# Patient Record
Sex: MALE | Race: WHITE | Employment: OTHER | URBAN - METROPOLITAN AREA
[De-identification: names, ages, dates, MRNs, and addresses within clinical notes are randomized per-mention and may not be internally consistent; named-entity substitution may affect disease eponyms.]

---

## 2018-02-20 ENCOUNTER — ANESTHESIA (OUTPATIENT)
Dept: ENDOSCOPY | Age: 68
End: 2018-02-20
Payer: COMMERCIAL

## 2018-02-20 ENCOUNTER — HOSPITAL ENCOUNTER (OUTPATIENT)
Age: 68
Setting detail: OUTPATIENT SURGERY
Discharge: HOME | End: 2018-02-20
Attending: INTERNAL MEDICINE | Admitting: INTERNAL MEDICINE
Payer: COMMERCIAL

## 2018-02-20 ENCOUNTER — ANESTHESIA EVENT (OUTPATIENT)
Dept: ENDOSCOPY | Age: 68
End: 2018-02-20
Payer: COMMERCIAL

## 2018-02-20 VITALS
TEMPERATURE: 98.1 F | OXYGEN SATURATION: 92 % | HEART RATE: 55 BPM | SYSTOLIC BLOOD PRESSURE: 113 MMHG | HEIGHT: 72 IN | WEIGHT: 253 LBS | RESPIRATION RATE: 18 BRPM | DIASTOLIC BLOOD PRESSURE: 57 MMHG | BODY MASS INDEX: 34.27 KG/M2

## 2018-02-20 LAB — GLUCOSE BLD STRIP.AUTO-MCNC: 157 MG/DL (ref 65–100)

## 2018-02-20 PROCEDURE — 74011250636 HC RX REV CODE- 250/636: Performed by: INTERNAL MEDICINE

## 2018-02-20 PROCEDURE — 88305 TISSUE EXAM BY PATHOLOGIST: CPT | Performed by: INTERNAL MEDICINE

## 2018-02-20 PROCEDURE — 74011250636 HC RX REV CODE- 250/636

## 2018-02-20 PROCEDURE — 76040000026: Performed by: INTERNAL MEDICINE

## 2018-02-20 PROCEDURE — 77030011640 HC PAD GRND REM COVD -A: Performed by: INTERNAL MEDICINE

## 2018-02-20 PROCEDURE — 82962 GLUCOSE BLOOD TEST: CPT

## 2018-02-20 PROCEDURE — 77030013991 HC SNR POLYP ENDOSC BSC -A: Performed by: INTERNAL MEDICINE

## 2018-02-20 PROCEDURE — 74011250636 HC RX REV CODE- 250/636: Performed by: ANESTHESIOLOGY

## 2018-02-20 PROCEDURE — 77030029929: Performed by: INTERNAL MEDICINE

## 2018-02-20 PROCEDURE — 76060000032 HC ANESTHESIA 0.5 TO 1 HR: Performed by: INTERNAL MEDICINE

## 2018-02-20 RX ORDER — SODIUM CHLORIDE, SODIUM LACTATE, POTASSIUM CHLORIDE, CALCIUM CHLORIDE 600; 310; 30; 20 MG/100ML; MG/100ML; MG/100ML; MG/100ML
100 INJECTION, SOLUTION INTRAVENOUS CONTINUOUS
Status: DISCONTINUED | OUTPATIENT
Start: 2018-02-20 | End: 2018-02-20 | Stop reason: HOSPADM

## 2018-02-20 RX ORDER — PROPOFOL 10 MG/ML
INJECTION, EMULSION INTRAVENOUS AS NEEDED
Status: DISCONTINUED | OUTPATIENT
Start: 2018-02-20 | End: 2018-02-20 | Stop reason: HOSPADM

## 2018-02-20 RX ORDER — PROPOFOL 10 MG/ML
INJECTION, EMULSION INTRAVENOUS
Status: DISCONTINUED | OUTPATIENT
Start: 2018-02-20 | End: 2018-02-20 | Stop reason: HOSPADM

## 2018-02-20 RX ORDER — SODIUM CHLORIDE 0.9 % (FLUSH) 0.9 %
5-10 SYRINGE (ML) INJECTION AS NEEDED
Status: CANCELLED | OUTPATIENT
Start: 2018-02-20

## 2018-02-20 RX ORDER — ONDANSETRON 2 MG/ML
INJECTION INTRAMUSCULAR; INTRAVENOUS AS NEEDED
Status: DISCONTINUED | OUTPATIENT
Start: 2018-02-20 | End: 2018-02-20 | Stop reason: HOSPADM

## 2018-02-20 RX ORDER — SODIUM CHLORIDE, SODIUM LACTATE, POTASSIUM CHLORIDE, CALCIUM CHLORIDE 600; 310; 30; 20 MG/100ML; MG/100ML; MG/100ML; MG/100ML
100 INJECTION, SOLUTION INTRAVENOUS CONTINUOUS
Status: CANCELLED | OUTPATIENT
Start: 2018-02-20

## 2018-02-20 RX ADMIN — PROPOFOL 250 MCG/KG/MIN: 10 INJECTION, EMULSION INTRAVENOUS at 12:13

## 2018-02-20 RX ADMIN — PROPOFOL 20 MG: 10 INJECTION, EMULSION INTRAVENOUS at 12:12

## 2018-02-20 RX ADMIN — ONDANSETRON 4 MG: 2 INJECTION INTRAMUSCULAR; INTRAVENOUS at 12:30

## 2018-02-20 RX ADMIN — ONABOTULINUMTOXINA 100 UNITS: 100 INJECTION, POWDER, LYOPHILIZED, FOR SOLUTION INTRADERMAL; INTRAMUSCULAR at 12:22

## 2018-02-20 RX ADMIN — SODIUM CHLORIDE, SODIUM LACTATE, POTASSIUM CHLORIDE, AND CALCIUM CHLORIDE 100 ML/HR: 600; 310; 30; 20 INJECTION, SOLUTION INTRAVENOUS at 12:04

## 2018-02-20 NOTE — ROUTINE PROCESS
VSS. Discharge instructions reviewed with patient and wife and copy of instructions sent home with patient. Dr. Aracely Copeland spoke with patient prior to discharge. Questions answered. Discharged via car, wheeled out by [employer]. IV discontinued prior to discharge.      Personal items with patient at discharge: clothing

## 2018-02-20 NOTE — PROCEDURES
COLONOSCOPY    DATE of PROCEDURE: 2/20/2018    MEDICATION:  MAC      INSTRUMENT: PCF    PROCEDURE: After obtaining informed consent, the patient was placed in the left lateral position and sedated. The endoscope was advanced to the cecum where the appendiceal orifice and ileocecal valve were identified. On withdrawal, the colon was carefully visualized in a 360 degree fashion. Retroflexion was performed in the rectum. The patient was taken to the recovery area in stable condition. FINDINGS:  Anus: external hemorrhoids  Rectum: internal hemorrhoids  Sigmoid: diverticulosis present  Descending Colon: diverticulosis present  Transverse Colon: diverticulosis present  Ascending Colon: 8 mm polyp removed by snare cautery. Diverticulosis present. Retroflexion performed. Cecum: normal  Terminal ileum: normal    Estimated blood loss: 0-minimal     ASSESSMENT/PLAN:  1.  Repeat colonoscopy 5 years      Dar Sung MD  [facility], [place]

## 2018-02-20 NOTE — H&P
[facility] H&P (Admission)        Date:  2/20/2018    Chief Complaint:  Dysphagia-achalasia, hx polyps    Subjective:     History of Present Illness:  Patient is a 75 y.o. being admitted for EGD/colon. PMH:  Past Medical History:   Diagnosis Date    Acute kidney failure, unspecified 3/27/2013    Adverse effect of anesthesia     AMI (acute myocardial infarction) 2001    CAD (coronary artery disease)     cardiac cath 12 years ago, no stents    Cardiorespiratory arrest -- likely from respiratory etiology. 12/16/2013    Chronic pain     lower back pain    COPD exacerbation ([facility] [address] 3/26/2013    COPD with acute exacerbation ([facility] [address] 2012    hospitalized at [facility]    Diabetes ([facility] [address]     type 2, adverage -155, none symptomatic    Dysphagia     GERD (gastroesophageal reflux disease)     hx of- no meds at this time    Hypertension     Insulin dependent diabetes mellitus ([facility] [address] 03/26/2013    avg- 160- hypo in 46s    Pancreatitis     2-2018    Sleep apnea     Unspecified adverse effect of anesthesia 12/16/13    \"stopped\" breathing w moderate sedation       PSH:  Past Surgical History:   Procedure Laterality Date    CARDIAC SURG PROCEDURE UNLIST      cardiac cath no stents    HX GI      EGD times 3    HX OTHER SURGICAL      skin tag removal from leg       Allergies:  No Known Allergies    Home Medications:  Prior to Admission medications    Medication Sig Start Date End Date Taking? Authorizing Provider   budesonide-formoterol (SYMBICORT) 160-4.5 mcg/actuation HFAA 2 puffs bid, rinse mouth after use. 2/19/18  Yes Hazel Nix NP   rosuvastatin (CRESTOR) 40 mg tablet Take 40 mg by mouth daily. Yes Historical Provider   insulin NPH/insulin regular (NOVOLIN 70/30) 100 unit/mL (70-30) injection by SubCUTAneous route. 70 units in the morning and 70 in evening   Yes Historical Provider   lisinopril (PRINIVIL, ZESTRIL) 10 mg tablet Take  by mouth nightly.    Yes Historical Provider   OXYGEN-AIR DELIVERY SYSTEMS 3 L by Nasal route nightly. Yes Historical Provider   cpap machine kit by Does Not Apply route. Yes Historical Provider   aspirin 81 mg tablet Take 81 mg by mouth daily. Take DOS per Dr Nick Greenwood   Yes Historical Provider   magnesium oxide (MAG-OX) 400 mg tablet Take 400 mg by mouth daily. Uses for chronic low Mag. Yes Historical Provider   metformin (GLUCOPHAGE) 1,000 mg tablet Take 1,000 mg by mouth two (2) times daily (with meals). Yes Naman Velazquez, MD   metoprolol (LOPRESSOR) 50 mg tablet Take 50 mg by mouth two (2) times a day. Take day of surgery per anesthesia protocol. Indications: HYPERTENSION   Yes Naman Velazquez MD   fenofibrate nanocrystallized (TRICOR) 145 mg tablet Take 145 mg by mouth nightly. Take day of surgery per anesthesia protocol. Indications: hypercholesterolemia, hyperlipidemia   Yes Naman Velazquez MD   sitagliptin (JANUVIA) 100 mg tablet Take 100 mg by mouth daily. Indications: TYPE 2 DIABETES MELLITUS   Yes Naman Velazquez MD   albuterol (PROVENTIL HFA, VENTOLIN HFA) 90 mcg/actuation inhaler Take 2 Puffs by inhalation every four (4) hours as needed for Wheezing. 3/29/13   Elian Diamond NP       Hospital Medications:  No current facility-administered medications for this encounter. Social History:  Social History   Substance Use Topics    Smoking status: Former Smoker     Packs/day: 1.00     Years: 30.00     Types: Cigarettes     Quit date: 4/30/2000    Smokeless tobacco: Never Used      Comment: quit 12 years ago    Alcohol use 0.0 oz/week     0 Standard drinks or equivalent per week      Comment: RARE         Family History:  Family History   Problem Relation Age of Onset    Diabetes Maternal Grandmother        Review of Systems:  A detailed 10 system ROS is obtained, with pertinent positives as listed above. All others are negative.     Objective:     Physical Exam:  Vitals:  Visit Vitals    /63    Pulse 62    Temp 98.1 °F (36.7 °C)    Resp 16    Ht 6' (1.829 m)    Wt 114.8 kg (253 lb)    SpO2 93%    BMI 34.31 kg/m2     Gen:  Pt is alert, cooperative, no acute distress  Skin: no large lesions  HEENT: MMM  Cardiovascular: Regular rate and rhythm. No murmurs, gallops, or rubs. Respiratory:  Comfortable breathing with no accessory muscle use. Clear breath sounds with no wheezes, rales, or rhonchi. GI:  Abdomen nondistended, soft, and nontender. Normal active bowel sounds. Neurological:  Gross memory appears intact. Patient is alert and oriented. Psychiatric:  Mood appears appropriate with judgement intact. Laboratory:    No results for input(s): WBC, HGB, HCT, PLT, MCV, NA, K, CL, CO2, BUN, CREA, CA, MG, GLU, AP, SGOT, GPT, TBIL, CBIL, ALB, TP, AML, LPSE, PTP, INR, APTT, HGBEXT, HCTEXT, PLTEXT in the last 72 hours. No lab exists for component: DBIL, INREXT    Assessment:       Active Problems:    * No active hospital problems. *      Plan:     Endoscopy and risks explained to the patient. Risks including reaction to sedation, cardiopulmonary events, infection, bleeding, perforation, requirement for surgery if complications should occur, death were explained to patient who expressed understanding and agreed to proceed with endoscopy.       Aicha Sanchez MD  [facility], [place]

## 2018-02-20 NOTE — DISCHARGE INSTRUCTIONS
Gastrointestinal Esophagogastroduodenoscopy (EGD)/ Endoscopic Ultrasound(EUS)- Upper Exam Discharge Instructions    1. Call Dr. Colleen Davis  for any problems or questions. 2. Contact the doctor's office for follow up appointment as directed. 3. Medication may cause drowsiness for several hours, therefore, do not drive or operate machinery for remainder of the day. 4. No alcohol today. 5. Ordinarily, you may resume regular diet and activity after exam unless otherwise specified by your physician. 6. For mild soreness in your throat you may use Cepacol throat lozenges or warm  salt-water gargles as needed. Any additional instructions:     1. Follow up in the office. Instructions given to Angella Braxton and other family members. Gastrointestinal Colonoscopy/Flexible Sigmoidoscopy - Lower Exam Discharge Instructions  1. Call Dr. Colleen Davis for any problems or questions. 2. Contact the doctors office for follow up appointment as directed  3. Medication may cause drowsiness for several hours, therefore, do not drive or operate machinery for remainder of the day. 4. No alcohol today. 5. Ordinarily, you may resume regular diet and activity after exam unless otherwise specified by your physician. 6. Because of air put into your colon during exam, you may experience some abdominal distension, relieved by the passage of gas, for several hours. 7. Contact your physician if you have any of the following:  a. Excessive amount of bleeding - large amount when having a bowel movement. Occasional specks of blood with bowel movement would not be unusual.  b. Severe abdominal pain  c. Fever or Chills  8. Polyp Removal - follow these additional instructions  a. Take Metamucil - 1 tablespoon in juice every morning for 3 days, if needed. b. No Aspirin, Advil, Aleve, Nuprin, Ibuprofen, or medications that contain these drugs for 2 weeks. Any additional instructions:  1. Repeat colonoscopy 5 years.       Instructions given to Mable Mac and other family members.

## 2018-02-20 NOTE — PROCEDURES
Esophagogastroduodenoscopy    DATE of PROCEDURE: 2/20/2018    MEDICATIONS ADMINISTERED: MAC    INSTRUMENT: GIF    PROCEDURE:  After obtaining informed consent, the patient was placed in the left lateral position and sedated. The endoscope was advanced under direct vision without difficulty. The esophagus, stomach (including retroflexed views) and duodenum were evaluated. The patient was taken to the recovery area in stable condition. FINDINGS:  ESOPHAGUS: normal. 1 cm above the dentate line, 100 units Botox were injected, 25 units each in 4 quadrants. STOMACH: normal  DUODENUM: normal with normal transverse views of the papilla    Estimated blood loss: 0-minimal     PLAN:  1.  Follow up in the office    Chanell Torres MD  [facility], [address]

## 2018-02-20 NOTE — ANESTHESIA PREPROCEDURE EVALUATION
Anesthetic History          Comments: Brief cardiac arrest during endoscopy procedure 5 yrs ago. Quickly resuscitated. Subsequently tolerated GETA well.      Review of Systems / Medical History  Patient summary reviewed, nursing notes reviewed and pertinent labs reviewed    Pulmonary    COPD: moderate    Sleep apnea           Neuro/Psych              Cardiovascular    Hypertension          CAD (17 yrs s/p MI)    Exercise tolerance: >4 METS     GI/Hepatic/Renal     GERD (Quiescent)           Endo/Other    Diabetes: well controlled, type 2, using insulin    Obesity     Other Findings              Physical Exam    Airway  Mallampati: II  TM Distance: > 6 cm  Neck ROM: decreased range of motion   Mouth opening: Normal     Cardiovascular  Regular rate and rhythm,  S1 and S2 normal,  no murmur, click, rub, or gallop             Dental      Comments: Capped upper incisors   Pulmonary  Breath sounds clear to auscultation               Abdominal  GI exam deferred       Other Findings            Anesthetic Plan    ASA: 3  Anesthesia type: total IV anesthesia            Anesthetic plan and risks discussed with: Patient

## 2018-02-20 NOTE — IP AVS SNAPSHOT
[address] [facility] 
[phone] Patient: Tara De Paz MRN: [id number] CMM:[birth year] About your hospitalization You were admitted on:  February 20, 2018 You last received care in the:  D ENDOSCOPY You were discharged on:  February 20, 2018 Why you were hospitalized Your primary diagnosis was:  Not on File Follow-up Information None Discharge Orders None A check carlos indicates which time of day the medication should be taken. My Medications ASK your doctor about these medications Instructions Each Dose to Equal  
 Morning Noon Evening Bedtime  
 albuterol 90 mcg/actuation inhaler Commonly known as:  PROVENTIL HFA, VENTOLIN HFA, PROAIR HFA Your last dose was: Your next dose is: Take 2 Puffs by inhalation every four (4) hours as needed for Wheezing. 2 Puff  
    
   
   
   
  
 aspirin 81 mg tablet Your last dose was: Your next dose is: Take 81 mg by mouth daily. Take DOS per Dr Cory Hartley 81 mg  
    
   
   
   
  
 budesonide-formoterol 160-4.5 mcg/actuation Hfaa Commonly known as:  SYMBICORT Your last dose was: Your next dose is:    
   
   
 2 puffs bid, rinse mouth after use. cpap machine kit Your last dose was: Your next dose is:    
   
   
 by Does Not Apply route. CRESTOR 40 mg tablet Generic drug:  rosuvastatin Your last dose was: Your next dose is: Take 40 mg by mouth daily. 40 mg  
    
   
   
   
  
 JANUVIA 100 mg tablet Generic drug:  SITagliptin Your last dose was: Your next dose is: Take 100 mg by mouth daily. Indications: TYPE 2 DIABETES MELLITUS  
 100 mg  
    
   
   
   
  
 lisinopril 10 mg tablet Commonly known as:  Melba Fernandez  
   
 Your last dose was: Your next dose is: Take  by mouth nightly.  
     
   
   
   
  
 magnesium oxide 400 mg tablet Commonly known as:  MAG-OX Your last dose was: Your next dose is: Take 400 mg by mouth daily. Uses for chronic low Mag.  
 400 mg  
    
   
   
   
  
 metFORMIN 1,000 mg tablet Commonly known as:  GLUCOPHAGE Your last dose was: Your next dose is: Take 1,000 mg by mouth two (2) times daily (with meals). 1000 mg  
    
   
   
   
  
 metoprolol tartrate 50 mg tablet Commonly known as:  LOPRESSOR Your last dose was: Your next dose is: Take 50 mg by mouth two (2) times a day. Take day of surgery per anesthesia protocol. Indications: HYPERTENSION 50 mg NovoLIN 70/30 U-100 Insulin 100 unit/mL (70-30) injection Generic drug:  insulin NPH/insulin regular Your last dose was: Your next dose is:    
   
   
 by SubCUTAneous route. 70 units in the morning and 70 in evening OXYGEN-AIR DELIVERY SYSTEMS Your last dose was: Your next dose is:    
   
   
 3 L by Nasal route nightly. 3 L  
    
   
   
   
  
 TRICOR 145 mg tablet Generic drug:  fenofibrate nanocrystallized Your last dose was: Your next dose is: Take 145 mg by mouth nightly. Take day of surgery per anesthesia protocol. Indications: hypercholesterolemia, hyperlipidemia 145 mg Discharge Instructions Gastrointestinal Esophagogastroduodenoscopy (EGD)/ Endoscopic Ultrasound(EUS)- Upper Exam Discharge Instructions 1. Call Dr. Dar Leslie  for any problems or questions. 2. Contact the doctor's office for follow up appointment as directed. 3. Medication may cause drowsiness for several hours, therefore, do not drive or operate machinery for remainder of the day. 4. No alcohol today. 5. Ordinarily, you may resume regular diet and activity after exam unless otherwise specified by your physician. 6. For mild soreness in your throat you may use Cepacol throat lozenges or warm  salt-water gargles as needed. Any additional instructions:  
 
1. Follow up in the office. Instructions given to Bindu Stoll and other family members. Gastrointestinal Colonoscopy/Flexible Sigmoidoscopy - Lower Exam Discharge Instructions 1. Call Dr. Marcelo Mead for any problems or questions. 2. Contact the doctors office for follow up appointment as directed 3. Medication may cause drowsiness for several hours, therefore, do not drive or operate machinery for remainder of the day. 4. No alcohol today. 5. Ordinarily, you may resume regular diet and activity after exam unless otherwise specified by your physician. 6. Because of air put into your colon during exam, you may experience some abdominal distension, relieved by the passage of gas, for several hours. 7. Contact your physician if you have any of the following: 
a. Excessive amount of bleeding  large amount when having a bowel movement. Occasional specks of blood with bowel movement would not be unusual. 
b. Severe abdominal pain 
c. Fever or Chills 8. Polyp Removal  follow these additional instructions 
a. Take Metamucil  1 tablespoon in juice every morning for 3 days, if needed. b. No Aspirin, Advil, Aleve, Nuprin, Ibuprofen, or medications that contain these drugs for 2 weeks. Any additional instructions: 1. Repeat colonoscopy 5 years. Instructions given to Bindu Stoll and other family members. Introducing [facility]! Dear Katlyn Dewey: Thank you for requesting a [employer]ix account. Our records indicate that you already have an active hipix account. You can access your account anytime at [email or web address] Did you know that you can access your hospital and ER discharge instructions at any time in [employer]? You can also review all of your test results from your hospital stay or ER visit. Additional Information If you have questions, please visit the Frequently Asked Questions section of the [employer] website at [email or web address] Remember, [employer] is NOT to be used for urgent needs. For medical emergencies, dial 911. Now available from your iPhone and Android! Providers Seen During Your Hospitalization Provider Specialty Primary office phone Jamie Austin MD Gastroenterology [phone] Your Primary Care Physician (PCP) Primary Care Physician Office Phone Office Fax Cristiana Fuentes [phone] You are allergic to the following No active allergies Recent Documentation Height Weight BMI Smoking Status 1.829 m 114.8 kg 34.31 kg/m2 Former Smoker Emergency Contacts Name Discharge Info Relation Home Work Mobile Manuel Petit  Spouse [3] [phone] Patient Belongings The following personal items are in your possession at time of discharge: 
  Dental Appliances: None  Visual Aid: Magnifying glass Please provide this summary of care documentation to your next provider. Signatures-by signing, you are acknowledging that this After Visit Summary has been reviewed with you and you have received a copy. Patient Signature:  ____________________________________________________________ Date:  ____________________________________________________________  
  
Cherymaria esther Mehta Provider Signature:  ____________________________________________________________ Date:  ____________________________________________________________

## 2018-02-20 NOTE — ANESTHESIA POSTPROCEDURE EVALUATION
Post-Anesthesia Evaluation and Assessment    Patient: Nisa Sosa MRN: [id number]  SSN: [id number]    YOB: 1950  Age: 75 y.o. Sex: male       Cardiovascular Function/Vital Signs  Visit Vitals    /55    Pulse 66    Temp 36.7 °C (98.1 °F)    Resp 20    Ht 6' (1.829 m)    Wt 114.8 kg (253 lb)    SpO2 92%    BMI 34.31 kg/m2       Patient is status post total IV anesthesia anesthesia for Procedure(s):  ESOPHAGOGASTRODUODENOSCOPY (EGD)  COLONOSCOPY/ BMI=35  BOTOX INJECTION  ENDOSCOPIC POLYPECTOMY. Nausea/Vomiting: None    Postoperative hydration reviewed and adequate. Pain:  Pain Scale 1: Numeric (0 - 10) (02/20/18 1316)  Pain Intensity 1: 0 (02/20/18 1316)   Managed    Neurological Status: At baseline    Mental Status and Level of Consciousness: Arousable    Pulmonary Status:   O2 Device: Room air (02/20/18 1316)   Adequate oxygenation and airway patent    Complications related to anesthesia: None    Post-anesthesia assessment completed.  No concerns    Signed By: Basil Vu MD     February 20, 2018

## 2019-01-16 PROBLEM — E66.01 SEVERE OBESITY (HCC): Status: ACTIVE | Noted: 2019-01-16

## 2022-03-19 PROBLEM — E66.01 SEVERE OBESITY (HCC): Status: ACTIVE | Noted: 2019-01-16

## 2022-10-04 ENCOUNTER — OFFICE VISIT (OUTPATIENT)
Dept: PULMONOLOGY | Age: 72
End: 2022-10-04
Payer: COMMERCIAL

## 2022-10-04 VITALS
WEIGHT: 263 LBS | SYSTOLIC BLOOD PRESSURE: 132 MMHG | DIASTOLIC BLOOD PRESSURE: 68 MMHG | TEMPERATURE: 97.1 F | HEIGHT: 72 IN | OXYGEN SATURATION: 93 % | HEART RATE: 70 BPM | BODY MASS INDEX: 35.62 KG/M2

## 2022-10-04 DIAGNOSIS — R09.02 HYPOXEMIA: ICD-10-CM

## 2022-10-04 DIAGNOSIS — J44.9 COPD, SEVERE (HCC): Primary | Chronic | ICD-10-CM

## 2022-10-04 DIAGNOSIS — G47.33 OBSTRUCTIVE SLEEP APNEA (ADULT) (PEDIATRIC): ICD-10-CM

## 2022-10-04 PROCEDURE — 99213 OFFICE O/P EST LOW 20 MIN: CPT | Performed by: NURSE PRACTITIONER

## 2022-10-04 PROCEDURE — 1123F ACP DISCUSS/DSCN MKR DOCD: CPT | Performed by: NURSE PRACTITIONER

## 2022-10-04 RX ORDER — ALBUTEROL SULFATE 2.5 MG/3ML
2.5 SOLUTION RESPIRATORY (INHALATION) 4 TIMES DAILY PRN
Qty: 360 ML | Refills: 11 | Status: SHIPPED | OUTPATIENT
Start: 2022-10-04

## 2022-10-04 RX ORDER — VITAMIN B COMPLEX
TABLET ORAL
COMMUNITY

## 2022-10-04 RX ORDER — ICOSAPENT ETHYL 1000 MG/1
CAPSULE ORAL
COMMUNITY
Start: 2019-09-10

## 2022-10-04 RX ORDER — ASPIRIN 81 MG/1
81 TABLET ORAL DAILY
COMMUNITY

## 2022-10-04 ASSESSMENT — ENCOUNTER SYMPTOMS
WHEEZING: 0
SHORTNESS OF BREATH: 1
CHEST TIGHTNESS: 0
COUGH: 1

## 2022-10-04 NOTE — PROGRESS NOTES
He is a 76-year-old male seen today for follow-up of COPD, sleep apnea, nocturnal hypoxia. I last saw him 2/2018. Since that time, he has been followed by Dr. Bessie Jimenez, as recently as 3/2022. Records are reviewed. DIAGNOSTICS:     CXR 3/13, CT of chest 3/2013. Spirometry 6/2013 - combined severe obstructive restrictive defect. Lung volumes were increased consistent with significant obstructive airways disease. The diffusion capacity corrected for alveolar volume was normal suggesting no loss of alveolo-capillary units. This is study is consistent with adiagnosis of asthma given the prominent bronchodilator response. Spirometry 10/14/13 - severe obstructive defect, no significant interval change. CXR 12/16/13 - clear lungs. Spirometry 4/16/14- severe obstructive defect, no significant interval change. spirometry 7/2015. Spirometry 2/7/2017 - moderately severe obstructive and restrictive defects, no significant change. Spirometry 1/2020-severe obstructive defect. No significant change. Spirometry 9/2021-severe obstructive defect with decreased FVC. No significant change from 2020.

## 2022-10-04 NOTE — PROGRESS NOTES
[address] Srinivasa Murphy. [address], [address] [facility]  [phone]    Patient Name:  Phuong Young II    YOB: 1950    Office Visit 10/4/2022      CHIEF COMPLAINT:      Chief Complaint   Patient presents with    COPD    Follow-up         ASSESSMENT:   (Medical Decision Making)                                                                                                                                          Encounter Diagnoses   Name Primary? COPD, severe ([address] Yes    Obstructive sleep apnea (adult) (pediatric)     Hypoxemia      He is stable symptomatically and compliant with Trelegy inhaler. Has used albuterol intermittently with good response. States that albuterol for nebulizer has  and he request refill. Reports compliance, good response and tolerance to CPAP. Compliant with oxygen bled into CPAP. Has discussed having oxygen concentrator serviced with DME company. PLAN:     Continue trelegy 100, 1 inhalation every morning, rinse mouth after use. Albuterol inhaler or nebulizer 4 times daily as needed. Continue CPAP and oxygen with sleep as prescribed. Newest COVID booster approximately 90 days from COVID infection. Flu vaccine in the next week or 2 assuming he continues to improve. He is up-to-date on pneumonia vaccines. Orders Placed This Encounter   Medications    albuterol (PROVENTIL) (2.5 MG/3ML) 0.083% nebulizer solution     Sig: Take 3 mLs by nebulization 4 times daily as needed for Wheezing or Shortness of Breath     Dispense:  360 mL     Refill:  11          Follow-up and Dispositions    Return in about 6 months (around 2023) for Genaro han MD (needs new provider appt w/ Dr Sj Montalvo or Dr Aris Huntley), COPD, YUNIOR,  , PFT's. Migue Staton, APRN - CNP    Total  time spent with patient -  32 min.      Collaborating MD: Dr. Celeste Cardenas:    He is a 59-year-old male seen today for follow-up of COPD, sleep apnea, nocturnal hypoxia. I last saw him 2018. Since that time, he has been followed by Dr. Radha Amin, as recently as 3/2022. Records are reviewed. He reports that he had COVID in September, following a cruise in August.  He reports having worsening shortness of breath at that time but has now improved. Has minimal, nonproductive cough. Minimal intermittent wheezing. He is compliant with Trelegy inhaler. Has used albuterol inhaler on average of 1-2 times daily with good response. States that he needs refill for albuterol via nebulizer to have on hand as his is . He has not required it recently. Reports nightly compliance with CPAP/oxygen, continues to note good response. DIAGNOSTICS:     CXR 3/13, CT of chest 3/2013. Spirometry 2013 - combined severe obstructive restrictive defect. Lung volumes were increased consistent with significant obstructive airways disease. The diffusion capacity corrected for alveolar volume was normal suggesting no loss of alveolo-capillary units. This is study is consistent with adiagnosis of asthma given the prominent bronchodilator response. Spirometry 10/14/13 - severe obstructive defect, no significant interval change. CXR 13 - clear lungs. Spirometry 14- severe obstructive defect, no significant interval change. spirometry 2015. Spirometry 2017 - moderately severe obstructive and restrictive defects, no significant change. Spirometry 2020-severe obstructive defect. No significant change. Spirometry 2021-severe obstructive defect with decreased FVC. No significant change from .    _____________________________________________________________      REVIEW OF SYSTEMS:    Review of Systems   Constitutional:  Negative for chills, fatigue, fever and unexpected weight change. Respiratory:  Positive for cough and shortness of breath. Negative for chest tightness and wheezing. Cardiovascular:  Negative for chest pain. PHYSICAL EXAM:    Vitals:    10/04/22 1515   BP: 132/68   Pulse: 70   Temp: 97.1 °F (36.2 °C)   TempSrc: Skin   SpO2: 93%   Weight: 263 lb (119.3 kg)   Height: 6' (1.829 m)        Body mass index is 35.67 kg/m². GENERAL APPEARANCE:  The patient is obese and in no respiratory distress. HEENT:  PERRL. Conjunctivae unremarkable. NECK/LYMPHATIC:   Symmetrical with no elevation of jugular venous pulsation. Trachea midline. LUNGS:   Normal respiratory effort with symmetrical lung expansion. Breath sounds-decreased but clear. Beatriz Bhavesh HEART:   There is a normal rate and regular rhythm. No murmur, rub, or gallop. There is no edema in the lower extremities. NEURO:  The patient is alert and oriented to person, place, and time. Memory appears intact and mood is normal.  No gross sensorimotor deficits are present. DIAGNOSTIC TESTS: Studies were personally reviewed by me and discussed with the patient.     Past Medical History:   Diagnosis Date    Acute kidney failure, unspecified ([address] 3/27/2013    Adverse effect of anesthesia     AMI (acute myocardial infarction) ([address] 2001    CAD (coronary artery disease)     cardiac cath 12 years ago, no stents    Cardiorespiratory arrest ([address] 12/16/2013    Chronic pain     lower back pain    COPD exacerbation ([address] 3/26/2013    COPD with acute exacerbation ([address] 2012    hospitalized at [facility]    Diabetes ([facility]     type 2, adverage -155, none symptomatic    Dysphagia     GERD (gastroesophageal reflux disease)     hx of- no meds at this time    Hypertension     Insulin dependent diabetes mellitus 03/26/2013    avg- 160- hypo in 50s    Pancreatitis     2-2018    Sleep apnea     Unspecified adverse effect of anesthesia 12/16/13    \"stopped\" breathing w moderate sedation       Patient Active Problem List   Diagnosis    CAD (coronary artery disease)    Hypoxemia    Obstructive sleep apnea (adult) (pediatric)    Obesity DM (diabetes mellitus) ([facility]    Hyperlipidemia    COPD, severe (HCC)    Severe obesity (HCC)    HTN (hypertension)    Hx of tobacco use, presenting hazards to health       Past Surgical History:   Procedure Laterality Date    COLONOSCOPY N/A 2018    COLONOSCOPY/ BMI=35 performed by Medardo Menon MD at [facility] ENDOSCOPY    GI      EGD times 3    OTHER SURGICAL HISTORY      skin tag removal from leg    [place] CARDIAC SURG PROCEDURE UNLIST      cardiac cath no stents         Social History     Socioeconomic History    Marital status:      Spouse name: None    Number of children: None    Years of education: None    Highest education level: None   Tobacco Use    Smoking status: Former     Packs/day: 1.00     Years: 30.00     Pack years: 30.00     Types: Cigarettes     Quit date: 2000     Years since quittin.4    Smokeless tobacco: Never    Tobacco comments:     Quit smoking: quit 12 years ago   Substance and Sexual Activity    Alcohol use: Yes     Alcohol/week: 0.0 standard drinks    Drug use: No   Social History Narrative    Lives with his wife and has a dog. 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 Family History   Problem Relation Age of Onset    Diabetes Maternal Grandmother        No Known Allergies    Current Outpatient Medications   Medication Sig    insulin aspart (NOVOLOG) 100 UNIT/ML injection pen Novolog- inject 25 units at breakfast, 15units at lunch, 35  units at supper, plus sliding scale for blood glucose over 150, up to 100 units per day.     aspirin 81 MG EC tablet Take 81 mg by mouth daily    Vitamin D (CHOLECALCIFEROL) 25 MCG (1000 UT) TABS tablet Take by mouth    Icosapent Ethyl (VASCEPA) 1 g CAPS capsule 2 grams at breakfast, 2 grams at supper    OXYGEN Inhale into the lungs At 3 lpm bled into CPAP    albuterol (PROVENTIL) (2.5 MG/3ML) 0.083% nebulizer solution Take 3 mLs by nebulization 4 times daily as needed for Wheezing or Shortness of Breath    albuterol sulfate  (90 Base) MCG/ACT inhaler Inhale 2 puffs into the lungs every 4 hours as needed    fenofibrate (TRICOR) 145 MG tablet Take 145 mg by mouth    fluticasone-umeclidin-vilant (TRELEGY ELLIPTA) 100-62.5-25 MCG/INH AEPB Inhale 1 puff into the lungs daily    lisinopril (PRINIVIL;ZESTRIL) 10 MG tablet Take by mouth    magnesium oxide (MAG-OX) 400 MG tablet Take 400 mg by mouth daily    metoprolol tartrate (LOPRESSOR) 50 MG tablet Take 50 mg by mouth 2 times daily    rosuvastatin (CRESTOR) 40 MG tablet Take 40 mg by mouth daily    insulin 70-30 (HUMULIN;NOVOLIN) (70-30) 100 UNIT per ML injection vial Inject into the skin (Patient not taking: Reported on 10/4/2022)     No current facility-administered medications for this visit. Over 50% of today's office visit was spent in face to face time reviewing test results, prognosis, importance of compliance, education about disease process, benefits of medications, instructions for management of acute symptoms, and follow up plans. Electronically signed. Dictated using voice recognition software.   Proof read but unrecognized errors may exist.

## 2022-10-04 NOTE — PATIENT INSTRUCTIONS
Continue trelegy 100, 1 inhalation every morning, rinse mouth after use. Albuterol inhaler or nebulizer 4 times daily as needed. Continue CPAP and oxygen with sleep as prescribed. Newest COVID booster approximately 90 days from COVID infection. Flu vaccine in the next week or 2 assuming he continues to improve. He is up-to-date on pneumonia vaccines.

## 2023-03-10 ENCOUNTER — OFFICE VISIT (OUTPATIENT)
Dept: ENT CLINIC | Age: 73
End: 2023-03-10

## 2023-03-10 VITALS
WEIGHT: 271 LBS | DIASTOLIC BLOOD PRESSURE: 60 MMHG | SYSTOLIC BLOOD PRESSURE: 140 MMHG | HEIGHT: 72 IN | BODY MASS INDEX: 36.7 KG/M2

## 2023-03-10 DIAGNOSIS — J34.3 HYPERTROPHY OF BOTH INFERIOR NASAL TURBINATES: ICD-10-CM

## 2023-03-10 DIAGNOSIS — H90.3 SENSORINEURAL HEARING LOSS (SNHL) OF BOTH EARS: ICD-10-CM

## 2023-03-10 DIAGNOSIS — T48.5X5A RHINITIS MEDICAMENTOSA: Primary | ICD-10-CM

## 2023-03-10 DIAGNOSIS — H69.83 ETD (EUSTACHIAN TUBE DYSFUNCTION), BILATERAL: ICD-10-CM

## 2023-03-10 DIAGNOSIS — J31.0 RHINITIS MEDICAMENTOSA: Primary | ICD-10-CM

## 2023-03-10 DIAGNOSIS — J30.9 ALLERGIC RHINITIS, UNSPECIFIED SEASONALITY, UNSPECIFIED TRIGGER: ICD-10-CM

## 2023-03-10 RX ORDER — FLUTICASONE PROPIONATE 50 MCG
2 SPRAY, SUSPENSION (ML) NASAL DAILY
Qty: 2 EACH | Refills: 5 | Status: SHIPPED | OUTPATIENT
Start: 2023-03-10

## 2023-03-10 RX ORDER — AZELASTINE 1 MG/ML
2 SPRAY, METERED NASAL 2 TIMES DAILY
Qty: 2 EACH | Refills: 5 | Status: SHIPPED | OUTPATIENT
Start: 2023-03-10

## 2023-03-10 ASSESSMENT — ENCOUNTER SYMPTOMS
SINUS PRESSURE: 1
ABDOMINAL PAIN: 0
COUGH: 0
SINUS PAIN: 1
EYE DISCHARGE: 0

## 2023-03-10 NOTE — PROGRESS NOTES
[address] ENT Office Note    Patient: Yenni Pham  MRN: [id number]  : 1950  Gender:  male  Vital Signs: BP (!) 140/60 (Site: Left Upper Arm, Position: Sitting)   Ht 6' (1.829 m)   Wt 271 lb (122.9 kg)   BMI 36.75 kg/m²   Date: 3/10/2023    CC:   Chief Complaint   Patient presents with    New Patient    Sinus Problem     Patient here for pain and pressure in his ears and sinus. HPI:  Kirsty Foster II is a 67 y.o. male with a history of bilateral sensorineural hearing loss. He had type B tympanograms. [facility] performed at [facility]. He also endorses mild ear fullness and pressure, left greater than right. He also endorses nasal congestion and uses Afrin regularly. Past Medical History, Past Surgical History, Family history, Social History, and Medications were all reviewed with the patient today and updated as necessary. No Known Allergies  Patient Active Problem List   Diagnosis    CAD (coronary artery disease)    Hypoxemia    Obstructive sleep apnea (adult) (pediatric)    Obesity    DM (diabetes mellitus) ([facility]    Hyperlipidemia    COPD, severe (HCC)    Severe obesity (HCC)    HTN (hypertension)    Hx of tobacco use, presenting hazards to health     Current Outpatient Medications   Medication Sig    azelastine (ASTELIN) 0.1 % nasal spray 2 sprays by Nasal route 2 times daily Use in each nostril as directed    fluticasone (FLONASE) 50 MCG/ACT nasal spray 2 sprays by Each Nostril route daily    insulin aspart (NOVOLOG) 100 UNIT/ML injection pen Novolog- inject 25 units at breakfast, 15units at lunch, 35  units at supper, plus sliding scale for blood glucose over 150, up to 100 units per day.     aspirin 81 MG EC tablet Take 81 mg by mouth daily    Vitamin D (CHOLECALCIFEROL) 25 MCG (1000 UT) TABS tablet Take by mouth    Icosapent Ethyl (VASCEPA) 1 g CAPS capsule 2 grams at breakfast, 2 grams at supper    OXYGEN Inhale into the lungs At 3 lpm bled into CPAP    albuterol (PROVENTIL) (2.5 MG/3ML) 0.083% nebulizer solution Take 3 mLs by nebulization 4 times daily as needed for Wheezing or Shortness of Breath    albuterol sulfate  (90 Base) MCG/ACT inhaler Inhale 2 puffs into the lungs every 4 hours as needed    fenofibrate (TRICOR) 145 MG tablet Take 145 mg by mouth    fluticasone-umeclidin-vilant (TRELEGY ELLIPTA) 100-62.5-25 MCG/INH AEPB Inhale 1 puff into the lungs daily    insulin 70-30 (HUMULIN;NOVOLIN) (70-30) 100 UNIT per ML injection vial Inject into the skin    lisinopril (PRINIVIL;ZESTRIL) 10 MG tablet Take by mouth    magnesium oxide (MAG-OX) 400 MG tablet Take 400 mg by mouth daily    metoprolol tartrate (LOPRESSOR) 50 MG tablet Take 50 mg by mouth 2 times daily    rosuvastatin (CRESTOR) 40 MG tablet Take 40 mg by mouth daily     No current facility-administered medications for this visit.     Past Medical History:   Diagnosis Date    Acute kidney failure, unspecified ([facility]) 3/27/2013    Adverse effect of anesthesia     AMI (acute myocardial infarction) ([facility])     CAD (coronary artery disease)     cardiac cath 12 years ago, no stents    Cardiorespiratory arrest ([facility]) 2013    Chronic pain     lower back pain    COPD exacerbation ([facility]) 3/26/2013    COPD with acute exacerbation ([facility])     hospitalized at [facility]    Diabetes ([facility])     type 2, adverage -155, none symptomatic    Dysphagia     GERD (gastroesophageal reflux disease)     hx of- no meds at this time    Hypertension     Insulin dependent diabetes mellitus 2013    avg- 160- hypo in 50s    Pancreatitis     2-    Sleep apnea     Unspecified adverse effect of anesthesia 13    \"stopped\" breathing w moderate sedation     Social History     Tobacco Use    Smoking status: Former     Packs/day: 1.00     Years: 30.00     Pack years: 30.00     Types: Cigarettes     Quit date: 2000     Years since quittin.8    Smokeless tobacco: Never    Tobacco comments:     Quit smoking: quit 12  years ago   Substance Use Topics    Alcohol use: Yes     Alcohol/week: 0.0 standard drinks     Past Surgical History:   Procedure Laterality Date    COLONOSCOPY N/A 2/20/2018    COLONOSCOPY/ BMI=35 performed by Wili Chris MD at [facility] ENDOSCOPY    GI      EGD times 3    OTHER SURGICAL HISTORY      skin tag removal from leg    [place] UNLISTED PROCEDURE CARDIAC SURGERY      cardiac cath no stents     Family History   Problem Relation Age of Onset    Diabetes Maternal Grandmother         ROS:    Review of Systems   Constitutional:  Negative for fever. HENT:  Positive for sinus pressure and sinus pain. Negative for ear discharge and ear pain. Eyes:  Negative for discharge. Respiratory:  Negative for cough. Cardiovascular:  Negative for chest pain. Gastrointestinal:  Negative for abdominal pain. Musculoskeletal:  Negative for arthralgias and neck pain. Skin:  Negative for rash. Allergic/Immunologic: Negative for environmental allergies. Neurological:  Negative for dizziness. Hematological:  Negative for adenopathy. PHYSICAL EXAM:    BP (!) 140/60 (Site: Left Upper Arm, Position: Sitting)   Ht 6' (1.829 m)   Wt 271 lb (122.9 kg)   BMI 36.75 kg/m²     General: NAD, well-appearing  Neuro: No gross neuro deficits. CN's II-XII intact. No facial weakness. Eyes: EOMI. Pupils reactive. No periorbital edema/ecchymosis. Skin: No facial erythema, rashes or concerning lesions. Nose: No external deviations or saddling. Intranasally, septum is midline without perforations, bilateral inferior turbinate hypertrophy  Mouth: Moist mucus membranes, normal tongue/palate mobility, no concerning mucosal lesions. Oropharynx: clear with no erythema/exudate, no tonsillar hypertrophy. Ears: Normal appearing auricles, no hematomas. EACs clear with no cerumen impaction, healthy canal skin, TM's intact with no perforations or retraction pockets. No middle ear effusions.   Neck: Soft, supple, no palpable lateral neck masses. No parotid or submandibular masses. No thyromegaly or palpable thyroid nodules. No surgical scars. Lymphatics: No palpable cervical LAD. Resp/Lungs: No audible stridor or wheezing, CTAB  Heart: RRR  Extremities: No clubbing or cyanosis. FL Barium Swallow    Anatomical Region Laterality Modality   Chest -- Radio Fluoroscopy   Abdomen -- --   Neck -- --     Impression    Status post recent POEM without evidence of contrast extravasation or leakage from the mid to distal thoracic esophagus. Narrative    EXAM: FL BARIUM SWALLOW SINGLE CONTRAST   DATE: 2/27/2020 1:38 PM   ACCESSION: [id number]   DICTATED: 2/27/2020 1:57 PM   INTERPRETATION LOCATION: [address] [place] [address]     CLINICAL INDICATION: 78years old Male with s/p POEM Procedure  - Ashley Payer. 0-Achalasia       TECHNIQUE: Single contrast barium swallow performed. The patient was given oral contrast to swallow and observed fluoroscopically. Videofluoroscopy and spot films of the thoracic esophagus were obtained. Fluoroscopy time was 1.2 minutes using a low dose system with pulsed fluoroscopy at 15 frames per second. COMPARISON: 2/14/2020     FINDINGS:   The patient was given oral contrast to swallow which the patient did without difficulty. There is no evidence of contrast extravasation or leakage from the mid to distal thoracic esophagus. The patient was given a barium tablet which passed without significant delay. Procedure Note    Franco Perrin MD - 02/27/2020   Formatting of this note might be different from the original.   EXAM: FL BARIUM SWALLOW SINGLE CONTRAST   DATE: 2/27/2020 1:38 PM   ACCESSION: [id number]   DICTATED: 2/27/2020 1:57 PM   INTERPRETATION LOCATION: [address] [place] [address]     CLINICAL INDICATION: 78years old Male with s/p POEM Procedure  - K22. 0-Achalasia       TECHNIQUE: Single contrast barium swallow performed. The patient was given oral contrast to swallow and observed fluoroscopically.   Videofluoroscopy and spot films of the thoracic esophagus were obtained. Fluoroscopy time was 1.2 minutes using a low dose system with pulsed fluoroscopy at 15 frames per second. COMPARISON: 2/14/2020     FINDINGS:   The patient was given oral contrast to swallow which the patient did without difficulty. There is no evidence of contrast extravasation or leakage from the mid to distal thoracic esophagus. The patient was given a barium tablet which passed without significant delay. IMPRESSION:   Status post recent POEM without evidence of contrast extravasation or leakage from the mid to distal thoracic esophagus. THYROID  [facility]  03/07/2022  Component      TSH     Component 03/07/2022 03/01/2021 02/22/2021           TSH 2.037        ASSESSMENT and PLAN  69-year-old male with bilateral sensorineural hearing loss. No conductive component. He did have type B tympanograms. Hearing test performed to the [place]. Normal ear exam today. He has rhinitis medicamentosa. He can proceed with hearing aids through the [place]. I will give him Astelin and Flonase to use twice daily in an effort to wean him off the Afrin. If nasal congestion persists then an office turbinate reduction would be a good option for him. ICD-10-CM    1. Rhinitis medicamentosa  J31.0     T48.5X5A       2. ETD (Eustachian tube dysfunction), bilateral  H69.83       3. Sensorineural hearing loss (SNHL) of both ears  H90.3       4. Hypertrophy of both inferior nasal turbinates  J34.3       5. Allergic rhinitis, unspecified seasonality, unspecified trigger  J30.9             Daniel Deal MD  3/10/2023  Electronically signed    Note dictated using voice recognition software. Please excuse any typos.

## 2023-04-04 ENCOUNTER — OFFICE VISIT (OUTPATIENT)
Dept: PULMONOLOGY | Age: 73
End: 2023-04-04
Payer: COMMERCIAL

## 2023-04-04 VITALS
DIASTOLIC BLOOD PRESSURE: 68 MMHG | OXYGEN SATURATION: 92 % | HEIGHT: 72 IN | TEMPERATURE: 97.9 F | BODY MASS INDEX: 39.55 KG/M2 | HEART RATE: 77 BPM | WEIGHT: 292 LBS | SYSTOLIC BLOOD PRESSURE: 132 MMHG

## 2023-04-04 DIAGNOSIS — J44.9 COPD, SEVERE (HCC): Primary | ICD-10-CM

## 2023-04-04 DIAGNOSIS — G47.33 OBSTRUCTIVE SLEEP APNEA (ADULT) (PEDIATRIC): ICD-10-CM

## 2023-04-04 DIAGNOSIS — R09.02 HYPOXEMIA: ICD-10-CM

## 2023-04-04 DIAGNOSIS — E66.01 SEVERE OBESITY (HCC): ICD-10-CM

## 2023-04-04 LAB
EXPIRATORY TIME: NORMAL
FEF 25-75% %PRED-PRE: NORMAL
FEF 25-75% PRED: NORMAL
FEF 25-75%-PRE: NORMAL
FEV1 %PRED-PRE: 47 %
FEV1 PRED: NORMAL
FEV1/FVC %PRED-PRE: NORMAL
FEV1/FVC PRED: NORMAL
FEV1/FVC: 64 %
FEV1: 1.59 L
FVC %PRED-PRE: 54 %
FVC PRED: NORMAL
FVC: 2.5 L
PEF %PRED-PRE: NORMAL
PEF PRED: NORMAL
PEF-PRE: NORMAL

## 2023-04-04 PROCEDURE — 94010 BREATHING CAPACITY TEST: CPT | Performed by: NURSE PRACTITIONER

## 2023-04-04 PROCEDURE — 1036F TOBACCO NON-USER: CPT | Performed by: NURSE PRACTITIONER

## 2023-04-04 PROCEDURE — 3075F SYST BP GE 130 - 139MM HG: CPT | Performed by: NURSE PRACTITIONER

## 2023-04-04 PROCEDURE — 3023F SPIROM DOC REV: CPT | Performed by: NURSE PRACTITIONER

## 2023-04-04 PROCEDURE — 3017F COLORECTAL CA SCREEN DOC REV: CPT | Performed by: NURSE PRACTITIONER

## 2023-04-04 PROCEDURE — 99214 OFFICE O/P EST MOD 30 MIN: CPT | Performed by: NURSE PRACTITIONER

## 2023-04-04 PROCEDURE — G8427 DOCREV CUR MEDS BY ELIG CLIN: HCPCS | Performed by: NURSE PRACTITIONER

## 2023-04-04 PROCEDURE — 1123F ACP DISCUSS/DSCN MKR DOCD: CPT | Performed by: NURSE PRACTITIONER

## 2023-04-04 PROCEDURE — G8417 CALC BMI ABV UP PARAM F/U: HCPCS | Performed by: NURSE PRACTITIONER

## 2023-04-04 PROCEDURE — 3078F DIAST BP <80 MM HG: CPT | Performed by: NURSE PRACTITIONER

## 2023-04-04 RX ORDER — FLUTICASONE FUROATE, UMECLIDINIUM BROMIDE AND VILANTEROL TRIFENATATE 100; 62.5; 25 UG/1; UG/1; UG/1
POWDER RESPIRATORY (INHALATION)
Qty: 3 EACH | Refills: 3 | Status: SHIPPED | OUTPATIENT
Start: 2023-04-04

## 2023-04-04 RX ORDER — ALBUTEROL SULFATE 90 UG/1
AEROSOL, METERED RESPIRATORY (INHALATION)
Qty: 3 EACH | Refills: 3 | Status: SHIPPED | OUTPATIENT
Start: 2023-04-04

## 2023-04-04 ASSESSMENT — ENCOUNTER SYMPTOMS
SHORTNESS OF BREATH: 1
WHEEZING: 0
COUGH: 1
SPUTUM PRODUCTION: 0
HEMOPTYSIS: 0

## 2023-04-04 ASSESSMENT — PULMONARY FUNCTION TESTS
FVC_PERCENT_PREDICTED_PRE: 54
FEV1/FVC: 64
FEV1: 1.59
FVC: 2.50
FEV1_PERCENT_PREDICTED_PRE: 47

## 2023-04-04 NOTE — PATIENT INSTRUCTIONS
Continue trelegy 100, 1 inhalation every morning, rinse mouth after use. Albuterol inhaler or nebulizer 4 times daily as needed. Continue CPAP and oxygen with sleep as prescribed.

## 2023-04-04 NOTE — PROGRESS NOTES
He is a 79-year-old male seen today for follow-up of COPD, sleep apnea, nocturnal hypoxia. DIAGNOSTICS:     CXR 3/13, CT of chest 3/2013. Spirometry 6/2013 - combined severe obstructive restrictive defect. Lung volumes were increased consistent with significant obstructive airways disease. The diffusion capacity corrected for alveolar volume was normal suggesting no loss of alveolo-capillary units. This is study is consistent with adiagnosis of asthma given the prominent bronchodilator response. Spirometry 10/14/13 - severe obstructive defect, no significant interval change. CXR 12/16/13 - clear lungs. Spirometry 4/16/14- severe obstructive defect, no significant interval change. spirometry 7/2015. Spirometry 2/7/2017 - moderately severe obstructive and restrictive defects, no significant change. Spirometry 1/2020-severe obstructive defect. No significant change. CXR 5/9/2021-multiple left lung opacities nonspecific, right lung is clear. Spirometry 9/2021-severe obstructive defect with decreased FVC.   No significant change from 2020
SpO2: 92%   Weight: 292 lb (132.5 kg)   Height: 6' (1.829 m)        Body mass index is 39.6 kg/m². GENERAL APPEARANCE:  The patient is obese and in no respiratory distress. HEENT:  PERRL. Conjunctivae unremarkable. NECK/LYMPHATIC:   Symmetrical with no elevation of jugular venous pulsation. Trachea midline. LUNGS:   Normal respiratory effort with symmetrical lung expansion. Breath sounds - decreased but clear. HEART:   There is a normal rate and regular rhythm. No murmur, rub, or gallop. There is no edema in the lower extremities. NEURO:  The patient is alert and oriented to person, place, and time. Memory appears intact and mood is normal.  No gross sensorimotor deficits are present. DIAGNOSTIC TESTS: Studies were personally reviewed by me and discussed with the patient. CPFT's 2013:     combined severe obstructive restrictive defect. Lung volumes were increased consistent with significant obstructive airways disease. The diffusion capacity corrected for alveolar volume was normal suggesting no loss of alveolo-capillary units. This is study is consistent with adiagnosis of asthma given the prominent bronchodilator response.      Spirometry:    9/2021:        Today:            Past Medical History:   Diagnosis Date    Acute kidney failure, unspecified ([address] 3/27/2013    Adverse effect of anesthesia     AMI (acute myocardial infarction) ([address] 2001    CAD (coronary artery disease)     cardiac cath 12 years ago, no stents    Cardiorespiratory arrest ([address] 12/16/2013    Chronic pain     lower back pain    COPD exacerbation ([address] 3/26/2013    COPD with acute exacerbation ([address] 2012    hospitalized at [facility]    Diabetes ([address]     type 2, adverage -155, none symptomatic    Dysphagia     GERD (gastroesophageal reflux disease)     hx of- no meds at this time    Hypertension     Insulin dependent diabetes mellitus 03/26/2013    avg- 160- hypo in 50s    Pancreatitis     2-2018    Sleep

## 2023-04-17 ENCOUNTER — PATIENT MESSAGE (OUTPATIENT)
Dept: PULMONOLOGY | Age: 73
End: 2023-04-17

## 2023-04-19 ENCOUNTER — TELEPHONE (OUTPATIENT)
Dept: PULMONOLOGY | Age: 73
End: 2023-04-19

## 2023-04-19 NOTE — TELEPHONE ENCOUNTER
----- Message from Tommie He, RN sent at 4/19/2023  2:03 PM EDT -----  Regarding: FW: jeffrey  Contact: [phone]  Please advise regarding substituted medication.    ----- Message -----  From: Sylvia Alexandre  Sent: 4/17/2023   2:56 PM EDT  To: , #  Subject: jeffrey                                          I spoke to [facility] today and they tell me that this medication is not on their list and on 4/5/23 they sent a message to [employer] requesting a substitute. Substitutes are stiolto, wixela, spiriva, asmanex. Please  follow up on this and have someone let me know at [phone].   Thank you

## 2023-04-19 NOTE — TELEPHONE ENCOUNTER
To have appropriate substitute for Trelegy, he would need Wixela 250/50, 1 inhalation twice daily, rinse mouth after use, plus Spiriva Respimat, 2.5, 2 inhalations every morning. However, I have had other [facility], as recently as today, he was told that Breztri 2 puffs twice daily, rinse mouth after use, was available as substitute for Trelegy. He may want to inquire as that would be simpler for her to use 1 inhaler. Also, need to know how this RX is to be handled, is the [place] prescribing it? If from us, need to know [address] .     Thanks, [employer]

## 2023-04-20 ENCOUNTER — PATIENT MESSAGE (OUTPATIENT)
Dept: PULMONOLOGY | Age: 73
End: 2023-04-20

## 2023-04-21 ENCOUNTER — TELEPHONE (OUTPATIENT)
Dept: PULMONOLOGY | Age: 73
End: 2023-04-21

## 2023-04-21 ENCOUNTER — CLINICAL DOCUMENTATION (OUTPATIENT)
Dept: PULMONOLOGY | Age: 73
End: 2023-04-21

## 2023-04-21 DIAGNOSIS — J44.9 COPD, SEVERE (HCC): Primary | ICD-10-CM

## 2023-04-21 RX ORDER — FLUTICASONE PROPIONATE AND SALMETEROL 250; 50 UG/1; UG/1
POWDER RESPIRATORY (INHALATION)
Qty: 3 EACH | Refills: 3 | Status: SHIPPED | OUTPATIENT
Start: 2023-04-21

## 2023-04-21 RX ORDER — FLUTICASONE FUROATE, UMECLIDINIUM BROMIDE AND VILANTEROL TRIFENATATE 100; 62.5; 25 UG/1; UG/1; UG/1
POWDER RESPIRATORY (INHALATION)
Qty: 3 EACH | Refills: 3 | Status: SHIPPED | OUTPATIENT
Start: 2023-04-21

## 2023-04-21 RX ORDER — FLUTICASONE PROPIONATE AND SALMETEROL 250; 50 UG/1; UG/1
1 POWDER RESPIRATORY (INHALATION) EVERY 12 HOURS
Qty: 1 EACH | Refills: 11 | Status: CANCELLED | OUTPATIENT
Start: 2023-04-21

## 2023-04-21 NOTE — TELEPHONE ENCOUNTER
I have sent in [id number] and [employer] respimat to [place] via fax. Please let him know. Also, if he needs assistance with technique for respimat device, he can come for sample and demo. Thanks, so much.

## 2023-04-21 NOTE — TELEPHONE ENCOUNTER
----- Message from Sherren Perla, RN sent at 4/20/2023  4:05 PM EDT -----  Regarding: FW: inhaler  Contact: [phone]    ----- Message -----  From: Jarred Gomez  Sent: 4/20/2023   2:12 PM EDT  To: , #  Subject: inhaler                                          I have called twice and sent one message since our last meeting to advise that [place] will not fill the trelegy prescription. They will accept Stiolto, wixela, spiriva or asmanex and the prescription should be faxed to [place] at [phone]. Could you please take care of this and advise me.   Thanks

## 2023-04-22 NOTE — PROGRESS NOTES
Patient has requested 3 month supply of [facility] while [facility] substitute from the [facility].   RX sent at his request.

## 2023-10-16 ENCOUNTER — OFFICE VISIT (OUTPATIENT)
Dept: PULMONOLOGY | Age: 73
End: 2023-10-16
Payer: OTHER GOVERNMENT

## 2023-10-16 VITALS
TEMPERATURE: 97.6 F | BODY MASS INDEX: 36.57 KG/M2 | RESPIRATION RATE: 14 BRPM | DIASTOLIC BLOOD PRESSURE: 74 MMHG | WEIGHT: 270 LBS | OXYGEN SATURATION: 92 % | HEIGHT: 72 IN | HEART RATE: 80 BPM | SYSTOLIC BLOOD PRESSURE: 153 MMHG

## 2023-10-16 DIAGNOSIS — G47.33 OBSTRUCTIVE SLEEP APNEA (ADULT) (PEDIATRIC): ICD-10-CM

## 2023-10-16 DIAGNOSIS — E66.01 SEVERE OBESITY (HCC): ICD-10-CM

## 2023-10-16 DIAGNOSIS — J44.9 COPD, SEVERE (HCC): Primary | ICD-10-CM

## 2023-10-16 DIAGNOSIS — R09.02 HYPOXEMIA: ICD-10-CM

## 2023-10-16 DIAGNOSIS — F17.211 CIGARETTE NICOTINE DEPENDENCE IN REMISSION: ICD-10-CM

## 2023-10-16 PROCEDURE — 3078F DIAST BP <80 MM HG: CPT | Performed by: NURSE PRACTITIONER

## 2023-10-16 PROCEDURE — 99214 OFFICE O/P EST MOD 30 MIN: CPT | Performed by: NURSE PRACTITIONER

## 2023-10-16 PROCEDURE — 1123F ACP DISCUSS/DSCN MKR DOCD: CPT | Performed by: NURSE PRACTITIONER

## 2023-10-16 PROCEDURE — 3077F SYST BP >= 140 MM HG: CPT | Performed by: NURSE PRACTITIONER

## 2023-10-16 RX ORDER — MONTELUKAST SODIUM 10 MG/1
10 TABLET ORAL NIGHTLY
Qty: 90 TABLET | Refills: 3 | Status: SHIPPED | OUTPATIENT
Start: 2023-10-16

## 2023-10-16 RX ORDER — FLUTICASONE FUROATE, UMECLIDINIUM BROMIDE AND VILANTEROL TRIFENATATE 200; 62.5; 25 UG/1; UG/1; UG/1
POWDER RESPIRATORY (INHALATION)
Qty: 3 EACH | Refills: 3 | Status: SHIPPED | OUTPATIENT
Start: 2023-10-16

## 2023-10-16 RX ORDER — FLUTICASONE FUROATE, UMECLIDINIUM BROMIDE AND VILANTEROL TRIFENATATE 200; 62.5; 25 UG/1; UG/1; UG/1
POWDER RESPIRATORY (INHALATION)
Qty: 30 EACH | Refills: 3 | Status: SHIPPED | OUTPATIENT
Start: 2023-10-16 | End: 2023-10-16 | Stop reason: SDUPTHER

## 2023-10-16 ASSESSMENT — ENCOUNTER SYMPTOMS
HEARTBURN: 0
HEMOPTYSIS: 0
SHORTNESS OF BREATH: 1
WHEEZING: 0
COUGH: 1
SPUTUM PRODUCTION: 0

## 2023-10-16 NOTE — PATIENT INSTRUCTIONS
Change Trelegy inhaler to 200, 1 inhalation every morning, rinse mouth after use. Begin Singulair 10 mg at bedtime for control of allergies/postnasal drainage/cough. If he does not perceive definite benefit after 30 days of treatment with Singulair, advised to discontinue it. Albuterol inhaler or nebulizer 4 times daily as needed. Continue Astelin and Flonase nasal sprays. Continue CPAP/oxygen. Commended in weight loss, additional weight loss is encouraged. Recommend seasonal flu vaccine, newest COVID booster and RSV vaccine. He is up-to-date on Prevnar 13 and has had PPSV23 Pneumovax booster since age 71.

## 2023-10-16 NOTE — PROGRESS NOTES
He is a 69-year-old male seen today for follow-up of COPD, sleep apnea, nocturnal hypoxia. DIAGNOSTICS:     CXR 3/13, CT of chest 3/2013. CPFT's 6/2013 - combined severe obstructive restrictive defect. Lung volumes were increased consistent with significant obstructive airways disease. The diffusion capacity corrected for alveolar volume was normal suggesting no loss of alveolo-capillary units. This is study is consistent with adiagnosis of asthma given the prominent bronchodilator response. Spirometry 10/14/13 - severe obstructive defect, no significant interval change. CXR 12/16/13 - clear lungs. Spirometry 4/16/14- severe obstructive defect, no significant interval change. spirometry 7/2015. Spirometry 2/7/2017 - moderately severe obstructive and restrictive defects, no significant change. Spirometry 1/2020-severe obstructive defect. No significant change. CXR 5/9/2021-multiple left lung opacities nonspecific, right lung is clear. Spirometry 9/2021-severe obstructive defect with decreased FVC. No significant change from 2020  Spirometry 4/4/2023.-Severe obstructive defect with decreased FVC.   Trend toward improvement in FVC and FEV1
cardiac cath no stents         Social History     Socioeconomic History    Marital status:      Spouse name: None    Number of children: None    Years of education: None    Highest education level: None   Tobacco Use    Smoking status: Former     Packs/day: 1.00     Years: 30.00     Additional pack years: 0.00     Total pack years: 30.00     Types: Cigarettes     Quit date: 2000     Years since quittin.4    Smokeless tobacco: Never    Tobacco comments:     Quit smoking: quit 12 years ago   Substance and Sexual Activity    Alcohol use: Yes     Alcohol/week: 0.0 standard drinks of alcohol    Drug use: No   Social History Narrative    Lives with his wife and has a dog. 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 Family History   Problem Relation Age of Onset    Diabetes Maternal Grandmother        No Known Allergies    Current Outpatient Medications   Medication Sig    fluticasone-umeclidin-vilant (TRELEGY ELLIPTA) 100-62.5-25 MCG/ACT AEPB inhaler 1 inhalation every morning, rinse mouth after use. albuterol sulfate HFA (PROVENTIL;VENTOLIN;PROAIR) 108 (90 Base) MCG/ACT inhaler 2 puffs 4 times daily if needed for shortness of breath or wheezing    azelastine (ASTELIN) 0.1 % nasal spray 2 sprays by Nasal route 2 times daily Use in each nostril as directed    fluticasone (FLONASE) 50 MCG/ACT nasal spray 2 sprays by Each Nostril route daily    insulin aspart (NOVOLOG) 100 UNIT/ML injection pen Novolog- inject 25 units at breakfast, 15units at lunch, 35  units at supper, plus sliding scale for blood glucose over 150, up to 100 units per day.     aspirin 81 MG EC tablet Take 1 tablet by mouth daily    Vitamin D (CHOLECALCIFEROL) 25 MCG (1000 UT) TABS tablet Take by mouth    Icosapent Ethyl (VASCEPA) 1 g CAPS capsule 2 grams at breakfast, 2 grams at supper    OXYGEN Inhale into the lungs At 3 lpm bled into CPAP    albuterol (PROVENTIL) (2.5 MG/3ML) 0.083% nebulizer solution Take 3 mLs by nebulization 4 times

## 2023-10-27 ENCOUNTER — PATIENT MESSAGE (OUTPATIENT)
Dept: PULMONOLOGY | Age: 73
End: 2023-10-27

## 2023-12-04 ENCOUNTER — OFFICE VISIT (OUTPATIENT)
Dept: ENT CLINIC | Age: 73
End: 2023-12-04
Payer: OTHER GOVERNMENT

## 2023-12-04 VITALS — HEIGHT: 72 IN | WEIGHT: 272 LBS | BODY MASS INDEX: 36.84 KG/M2

## 2023-12-04 DIAGNOSIS — J30.9 ALLERGIC RHINITIS, UNSPECIFIED SEASONALITY, UNSPECIFIED TRIGGER: ICD-10-CM

## 2023-12-04 DIAGNOSIS — R04.0 EPISTAXIS: Primary | ICD-10-CM

## 2023-12-04 PROCEDURE — 99213 OFFICE O/P EST LOW 20 MIN: CPT | Performed by: STUDENT IN AN ORGANIZED HEALTH CARE EDUCATION/TRAINING PROGRAM

## 2023-12-04 PROCEDURE — 30901 CONTROL OF NOSEBLEED: CPT | Performed by: STUDENT IN AN ORGANIZED HEALTH CARE EDUCATION/TRAINING PROGRAM

## 2023-12-04 PROCEDURE — 1123F ACP DISCUSS/DSCN MKR DOCD: CPT | Performed by: STUDENT IN AN ORGANIZED HEALTH CARE EDUCATION/TRAINING PROGRAM

## 2023-12-04 RX ORDER — DICYCLOMINE HYDROCHLORIDE 10 MG/1
CAPSULE ORAL
COMMUNITY
Start: 2023-10-31

## 2023-12-04 RX ORDER — INSULIN ADMIN. SUPPLIES
INSULIN PEN (EA) SUBCUTANEOUS
COMMUNITY
Start: 2023-10-18

## 2023-12-04 RX ORDER — MECLIZINE HYDROCHLORIDE 25 MG/1
25 TABLET ORAL
COMMUNITY

## 2023-12-04 RX ORDER — CLONAZEPAM 1 MG/1
1 TABLET ORAL
COMMUNITY
Start: 2019-12-10

## 2023-12-04 RX ORDER — METOPROLOL SUCCINATE 50 MG/1
TABLET, EXTENDED RELEASE ORAL
COMMUNITY
Start: 2023-10-18

## 2023-12-04 RX ORDER — AZELASTINE 1 MG/ML
2 SPRAY, METERED NASAL 2 TIMES DAILY
Qty: 2 EACH | Refills: 5 | Status: SHIPPED | OUTPATIENT
Start: 2023-12-04

## 2023-12-04 RX ORDER — FLUTICASONE PROPIONATE 50 MCG
2 SPRAY, SUSPENSION (ML) NASAL DAILY
Qty: 2 EACH | Refills: 5 | Status: SHIPPED | OUTPATIENT
Start: 2023-12-04

## 2023-12-04 RX ORDER — PANTOPRAZOLE SODIUM 40 MG/1
40 TABLET, DELAYED RELEASE ORAL DAILY
COMMUNITY
Start: 2022-05-16

## 2023-12-04 RX ORDER — NITROGLYCERIN 0.4 MG/1
0.4 TABLET SUBLINGUAL EVERY 5 MIN PRN
COMMUNITY
Start: 2019-02-08

## 2023-12-04 RX ORDER — HYDROCHLOROTHIAZIDE 25 MG/1
TABLET ORAL
COMMUNITY
Start: 2023-12-01

## 2023-12-04 ASSESSMENT — ENCOUNTER SYMPTOMS
VOMITING: 0
EYE REDNESS: 0
SHORTNESS OF BREATH: 0
EYE ITCHING: 0

## 2023-12-04 NOTE — PROGRESS NOTES
tablet by mouth nightly    fluticasone-umeclidin-vilant (TRELEGY ELLIPTA) 200-62.5-25 MCG/ACT AEPB inhaler 1 inhalation every morning, rinse mouth after use    albuterol sulfate HFA (PROVENTIL;VENTOLIN;PROAIR) 108 (90 Base) MCG/ACT inhaler 2 puffs 4 times daily if needed for shortness of breath or wheezing    insulin aspart (NOVOLOG) 100 UNIT/ML injection pen Novolog- inject 25 units at breakfast, 15units at lunch, 35  units at supper, plus sliding scale for blood glucose over 150, up to 100 units per day. aspirin 81 MG EC tablet Take 1 tablet by mouth daily    Vitamin D (CHOLECALCIFEROL) 25 MCG (1000 UT) TABS tablet Take by mouth    Icosapent Ethyl (VASCEPA) 1 g CAPS capsule 2 grams at breakfast, 2 grams at supper    OXYGEN Inhale into the lungs At 3 lpm bled into CPAP    albuterol (PROVENTIL) (2.5 MG/3ML) 0.083% nebulizer solution Take 3 mLs by nebulization 4 times daily as needed for Wheezing or Shortness of Breath    fenofibrate (TRICOR) 145 MG tablet Take 1 tablet by mouth    lisinopril (PRINIVIL;ZESTRIL) 10 MG tablet Take by mouth    magnesium oxide (MAG-OX) 400 MG tablet Take 1 tablet by mouth daily    metoprolol tartrate (LOPRESSOR) 50 MG tablet Take 1 tablet by mouth 2 times daily    rosuvastatin (CRESTOR) 40 MG tablet Take 1 tablet by mouth daily     No current facility-administered medications for this visit.      Past Medical History:   Diagnosis Date    Acute kidney failure, unspecified ([address] [place] [address]) 3/27/2013    Adverse effect of anesthesia     AMI (acute myocardial infarction) ([address] [place] [address]) 2001    CAD (coronary artery disease)     cardiac cath 12 years ago, no stents    Cardiorespiratory arrest ([address] [place] [address]) 12/16/2013    Chronic pain     lower back pain    COPD exacerbation ([address] [place] [address]) 3/26/2013    COPD with acute exacerbation ([address] [place] [address]) 2012    hospitalized at [facility]    Diabetes ([address] [place] [address])     type 2, adverage -155, none symptomatic    Dysphagia     GERD (gastroesophageal reflux disease)     hx of- no meds at this time

## 2024-05-21 ENCOUNTER — OFFICE VISIT (OUTPATIENT)
Dept: PULMONOLOGY | Age: 74
End: 2024-05-21
Payer: OTHER GOVERNMENT

## 2024-05-21 VITALS
BODY MASS INDEX: 36.57 KG/M2 | OXYGEN SATURATION: 90 % | RESPIRATION RATE: 18 BRPM | TEMPERATURE: 97.2 F | HEART RATE: 76 BPM | SYSTOLIC BLOOD PRESSURE: 115 MMHG | HEIGHT: 72 IN | DIASTOLIC BLOOD PRESSURE: 60 MMHG | WEIGHT: 270 LBS

## 2024-05-21 DIAGNOSIS — J44.9 COPD, SEVERE (HCC): Primary | ICD-10-CM

## 2024-05-21 DIAGNOSIS — J44.1 COPD EXACERBATION (HCC): ICD-10-CM

## 2024-05-21 DIAGNOSIS — Z77.090 ASBESTOS EXPOSURE: ICD-10-CM

## 2024-05-21 DIAGNOSIS — G47.33 OBSTRUCTIVE SLEEP APNEA (ADULT) (PEDIATRIC): ICD-10-CM

## 2024-05-21 DIAGNOSIS — F17.211 CIGARETTE NICOTINE DEPENDENCE IN REMISSION: ICD-10-CM

## 2024-05-21 DIAGNOSIS — R09.02 HYPOXEMIA: ICD-10-CM

## 2024-05-21 LAB
EXPIRATORY TIME: NORMAL
FEF 25-75% %PRED-PRE: NORMAL
FEF 25-75% PRED: NORMAL
FEF 25-75-PRE: NORMAL
FEV1 %PRED-PRE: 36 %
FEV1 PRED: 3.24 L
FEV1/FVC %PRED-PRE: NORMAL
FEV1/FVC PRED: NORMAL
FEV1/FVC: 56 %
FEV1: 1.17 L
FVC %PRED-PRE: 48 %
FVC PRED: 4.36 L
FVC: 2.11 L
PEF %PRED-PRE: NORMAL
PEF PRED: NORMAL
PEF-PRE: NORMAL

## 2024-05-21 PROCEDURE — 99214 OFFICE O/P EST MOD 30 MIN: CPT | Performed by: NURSE PRACTITIONER

## 2024-05-21 PROCEDURE — 1123F ACP DISCUSS/DSCN MKR DOCD: CPT | Performed by: NURSE PRACTITIONER

## 2024-05-21 PROCEDURE — 3078F DIAST BP <80 MM HG: CPT | Performed by: NURSE PRACTITIONER

## 2024-05-21 PROCEDURE — 3074F SYST BP LT 130 MM HG: CPT | Performed by: NURSE PRACTITIONER

## 2024-05-21 RX ORDER — ALBUTEROL SULFATE 2.5 MG/3ML
SOLUTION RESPIRATORY (INHALATION)
Qty: 1080 ML | Refills: 3 | Status: SHIPPED | OUTPATIENT
Start: 2024-05-21

## 2024-05-21 RX ORDER — ALBUTEROL SULFATE 2.5 MG/3ML
SOLUTION RESPIRATORY (INHALATION)
Qty: 360 ML | Refills: 11 | Status: SHIPPED | OUTPATIENT
Start: 2024-05-21 | End: 2024-05-21

## 2024-05-21 RX ORDER — PREDNISONE 20 MG/1
TABLET ORAL
Qty: 15 TABLET | Refills: 0 | Status: SHIPPED | OUTPATIENT
Start: 2024-05-21

## 2024-05-21 ASSESSMENT — PULMONARY FUNCTION TESTS
FEV1/FVC: 56
FEV1: 1.17
FEV1_PREDICTED: 3.24
FVC_PERCENT_PREDICTED_PRE: 48
FVC: 2.11
FEV1_PERCENT_PREDICTED_PRE: 36
FVC_PREDICTED: 4.36

## 2024-05-21 ASSESSMENT — ENCOUNTER SYMPTOMS
SHORTNESS OF BREATH: 1
WHEEZING: 1
SPUTUM PRODUCTION: 1
COUGH: 1

## 2024-05-21 NOTE — PATIENT INSTRUCTIONS
Albuterol via nebulizer 4 times daily for shortness of breath, wheezing, cough/chest congestion and mucus clearance, until he has returned to baseline.    Prescription for albuterol via nebulizer is sent to his local [address] pharmacy but [facility].    May use albuterol inhaler 2 puffs 4 times daily if needed when away from home/nebulizer.    I have provided prescription for prednisone taper if he continues to have wheezing/shortness of breath despite using nebulizer 4 times daily.  He will need to monitor his blood sugar closely and use insulin as directed.    If he requires prednisone, recommend that he avoid taking any over-the-counter nonsteroidals simultaneously (such as ibuprofen, Advil, Motrin, Aleve, naproxen).    OTC mucolytic, (mucinex or generic equivalent of guaifenesin), 2400mg in 24 hours in divided doses as needed to thin mucous.    Continue Trelegy 200, 1 inhalation every morning.    Continue Singulair 10 mg at bedtime, Astelin and Flonase nasal spray.    We will send order to [employer] for portable oxygen concentrator.    Continue CPAP and oxygen with [facility].

## 2024-05-21 NOTE — PROGRESS NOTES
He is a 73-year-old male seen today for follow-up of COPD, sleep apnea, nocturnal hypoxia.         DIAGNOSTICS:     CXR 3/13, CT of chest 3/2013.   CPFT's 6/2013 - combined severe obstructive restrictive defect. Lung volumes were increased consistent with significant obstructive airways disease. The diffusion capacity corrected for alveolar volume was normal suggesting no loss of alveolo-capillary units. This is study is consistent with adiagnosis of asthma given the prominent bronchodilator response.   Spirometry 10/14/13 - severe obstructive defect, no significant interval change.   CXR 12/16/13 - clear lungs.   Spirometry 4/16/14- severe obstructive defect, no significant interval change.  spirometry 7/2015.  Spirometry 2/7/2017 - moderately severe obstructive and restrictive defects, no significant change.  Spirometry 1/2020-severe obstructive defect.  No significant change.  CXR 5/9/2021-multiple left lung opacities nonspecific, right lung is clear.  Spirometry 9/2021-severe obstructive defect with decreased FVC.  No significant change from 2020  Spirometry 4/4/2023.-Severe obstructive defect with decreased FVC.  Trend toward improvement in FVC and FEV1  
clear.  Spirometry 9/2021-severe obstructive defect with Decreased FVC.  No significant change from 2020  Spirometry 4/4/2023.-Severe obstructive defect with decreased FVC.  Trend toward improvement in FVC and FEV1.  spirometry 5/21/2024-severe obstructive defect with decreased FVC.  Interval decline in FVC and FEV1.  Ambulatory oximetry 5/21/2024-baseline saturation 94% room air at rest, 87% room air with ambulation, 94% on 2 L/min with ambulation.  _____________________________________________________________      REVIEW OF SYSTEMS:    Review of Systems   Constitutional: Positive for weight gain. Negative for chills and fever.   Cardiovascular:  Negative for chest pain and leg swelling.   Respiratory:  Positive for cough, shortness of breath, sputum production and wheezing.           PHYSICAL EXAM:    Vitals:    05/21/24 0935   BP: 115/60   Pulse: 76   Resp: 18   Temp: 97.2 °F (36.2 °C)   SpO2: 90%   Weight: 122.5 kg (270 lb)   Height: 1.829 m (6')        Body mass index is 36.62 kg/m².    GENERAL APPEARANCE:  The patient is obese and in no respiratory distress.    HEENT:  PERRL.  Conjunctivae unremarkable.    NECK/LYMPHATIC:   Symmetrical with no elevation of jugular venous pulsation.  Trachea midline.     LUNGS:   Normal respiratory effort with symmetrical lung expansion.   Breath sounds - decreased, scattered rhonchi and faint wheeze, loudest L lung base  improves but does not resolve with cough.  No crackles.    HEART:   There is a normal rate and regular rhythm.  No murmur, rub, or gallop.  There is no edema in the lower extremities.    NEURO:  The patient is alert and oriented to person, place, and time.  Memory appears intact and mood is normal.  No gross sensorimotor deficits are present.      DIAGNOSTIC TESTS: Studies were personally reviewed by me and discussed with the patient.    Spirometry:        Latest Ref Rng & Units 5/21/2024     9:42 AM 4/4/2023    12:00 AM   Office Spirometry Results   FVC L

## 2024-06-21 ENCOUNTER — TELEPHONE (OUTPATIENT)
Dept: PULMONOLOGY | Age: 74
End: 2024-06-21

## 2024-06-21 NOTE — TELEPHONE ENCOUNTER
I Have called the patient in regards of his oxygen problem. I have tried to reach [employer] by the numbers the patient has provided with no success. I have left a VM and will be waiting for a call back before regarding this situation. NAGA KANG MA

## 2024-06-26 DIAGNOSIS — R09.02 HYPOXEMIA: ICD-10-CM

## 2024-06-26 DIAGNOSIS — J44.9 COPD, SEVERE (HCC): Primary | ICD-10-CM

## 2024-09-16 ENCOUNTER — OFFICE VISIT (OUTPATIENT)
Dept: PULMONOLOGY | Age: 74
End: 2024-09-16
Payer: OTHER GOVERNMENT

## 2024-09-16 VITALS
TEMPERATURE: 97.2 F | OXYGEN SATURATION: 92 % | RESPIRATION RATE: 18 BRPM | WEIGHT: 277 LBS | HEART RATE: 66 BPM | SYSTOLIC BLOOD PRESSURE: 124 MMHG | HEIGHT: 72 IN | BODY MASS INDEX: 37.52 KG/M2 | DIASTOLIC BLOOD PRESSURE: 72 MMHG

## 2024-09-16 DIAGNOSIS — F17.211 CIGARETTE NICOTINE DEPENDENCE IN REMISSION: ICD-10-CM

## 2024-09-16 DIAGNOSIS — R09.02 HYPOXEMIA: ICD-10-CM

## 2024-09-16 DIAGNOSIS — Z77.090 ASBESTOS EXPOSURE: ICD-10-CM

## 2024-09-16 DIAGNOSIS — J44.9 COPD, SEVERE (HCC): Primary | ICD-10-CM

## 2024-09-16 DIAGNOSIS — G47.33 OBSTRUCTIVE SLEEP APNEA (ADULT) (PEDIATRIC): ICD-10-CM

## 2024-09-16 LAB
EXPIRATORY TIME: NORMAL
FEF 25-75% %PRED-PRE: NORMAL
FEF 25-75% PRED: NORMAL
FEF 25-75-PRE: NORMAL
FEV1 %PRED-PRE: 44 %
FEV1 PRED: 3.23 L
FEV1/FVC %PRED-PRE: NORMAL
FEV1/FVC PRED: 82 %
FEV1/FVC: 62 %
FEV1: 1.41 L
FVC %PRED-PRE: 53 %
FVC PRED: 4.34 L
FVC: 2.29 L
PEF %PRED-PRE: NORMAL
PEF PRED: NORMAL
PEF-PRE: NORMAL

## 2024-09-16 PROCEDURE — 1123F ACP DISCUSS/DSCN MKR DOCD: CPT | Performed by: NURSE PRACTITIONER

## 2024-09-16 PROCEDURE — 94010 BREATHING CAPACITY TEST: CPT | Performed by: NURSE PRACTITIONER

## 2024-09-16 PROCEDURE — 99214 OFFICE O/P EST MOD 30 MIN: CPT | Performed by: NURSE PRACTITIONER

## 2024-09-16 PROCEDURE — 3074F SYST BP LT 130 MM HG: CPT | Performed by: NURSE PRACTITIONER

## 2024-09-16 PROCEDURE — 3078F DIAST BP <80 MM HG: CPT | Performed by: NURSE PRACTITIONER

## 2024-09-16 RX ORDER — FLUTICASONE FUROATE, UMECLIDINIUM BROMIDE AND VILANTEROL TRIFENATATE 200; 62.5; 25 UG/1; UG/1; UG/1
POWDER RESPIRATORY (INHALATION)
Qty: 3 EACH | Refills: 3 | Status: SHIPPED | OUTPATIENT
Start: 2024-09-16

## 2024-09-16 ASSESSMENT — ENCOUNTER SYMPTOMS
SPUTUM PRODUCTION: 0
SHORTNESS OF BREATH: 0
WHEEZING: 0
COUGH: 1

## 2024-09-16 ASSESSMENT — PULMONARY FUNCTION TESTS
FEV1/FVC: 62
FVC_PREDICTED: 4.34
FEV1_PERCENT_PREDICTED_PRE: 44
FEV1: 1.41
FEV1/FVC_PREDICTED: 82
FVC_PERCENT_PREDICTED_PRE: 53
FEV1_PREDICTED: 3.23
FVC: 2.29

## 2025-05-02 ENCOUNTER — OFFICE VISIT (OUTPATIENT)
Dept: ENT CLINIC | Age: 75
End: 2025-05-02

## 2025-05-02 VITALS — HEIGHT: 72 IN | BODY MASS INDEX: 36.57 KG/M2 | RESPIRATION RATE: 16 BRPM | WEIGHT: 270 LBS

## 2025-05-02 DIAGNOSIS — H90.3 SENSORINEURAL HEARING LOSS (SNHL) OF BOTH EARS: ICD-10-CM

## 2025-05-02 DIAGNOSIS — H93.13 TINNITUS OF BOTH EARS: ICD-10-CM

## 2025-05-02 DIAGNOSIS — H69.93 ETD (EUSTACHIAN TUBE DYSFUNCTION), BILATERAL: Primary | ICD-10-CM

## 2025-05-02 DIAGNOSIS — H65.92 OME (OTITIS MEDIA WITH EFFUSION), LEFT: ICD-10-CM

## 2025-05-02 DIAGNOSIS — G47.33 OSA (OBSTRUCTIVE SLEEP APNEA): ICD-10-CM

## 2025-05-02 RX ORDER — PREDNISONE 10 MG/1
1 TABLET ORAL 2 TIMES DAILY
COMMUNITY
Start: 2024-12-10

## 2025-05-02 RX ORDER — ATORVASTATIN CALCIUM 80 MG/1
80 TABLET, FILM COATED ORAL DAILY
COMMUNITY

## 2025-05-02 RX ORDER — OFLOXACIN 3 MG/ML
5 SOLUTION AURICULAR (OTIC) 2 TIMES DAILY
Qty: 5 ML | Refills: 0 | Status: SHIPPED | OUTPATIENT
Start: 2025-05-02 | End: 2025-05-05

## 2025-05-02 RX ORDER — SACCHAROMYCES BOULARDII 250 MG
250 CAPSULE ORAL 2 TIMES DAILY
COMMUNITY

## 2025-05-02 RX ORDER — ACETAMINOPHEN 500 MG
1000 TABLET ORAL EVERY 6 HOURS PRN
COMMUNITY

## 2025-05-02 RX ORDER — LANCETS
EACH MISCELLANEOUS
COMMUNITY
Start: 2025-02-25

## 2025-05-02 RX ORDER — DICLOFENAC SODIUM 75 MG/1
TABLET, DELAYED RELEASE ORAL
COMMUNITY
Start: 2025-03-31

## 2025-05-02 RX ORDER — FAMOTIDINE 40 MG/1
40 TABLET, FILM COATED ORAL DAILY
COMMUNITY

## 2025-05-02 RX ORDER — ATORVASTATIN CALCIUM 80 MG/1
80 TABLET, FILM COATED ORAL NIGHTLY
COMMUNITY

## 2025-05-02 RX ORDER — SOLIFENACIN SUCCINATE 10 MG/1
10 TABLET, FILM COATED ORAL DAILY
COMMUNITY
Start: 2024-04-29

## 2025-05-02 ASSESSMENT — ENCOUNTER SYMPTOMS
SHORTNESS OF BREATH: 0
EYE ITCHING: 0
FACIAL SWELLING: 0
SINUS PAIN: 0
CHOKING: 0
NAUSEA: 0
EYE PAIN: 0
SINUS PRESSURE: 0
WHEEZING: 0
COUGH: 0
APNEA: 0
STRIDOR: 0
EYE DISCHARGE: 0
CONSTIPATION: 0
DIARRHEA: 0

## 2025-05-02 NOTE — PROGRESS NOTES
[facility] Office Note    Patient: Bhavin William II  MRN: [id number]  : 1950  Gender:  male  Vital Signs: Resp 16   Ht 1.829 m (6')   Wt 122.5 kg (270 lb)   BMI 36.62 kg/m²   Date: 2025    CC:   Chief Complaint   Patient presents with    Ear Problem     Tinnitus-sounds like a drum. He is not currently having it. Pt also state he's gets a dry spot at the tip of his nose from his cpap.        HPI:  Bhavin William II is a 74 y.o. male who had previously seen for allergic rhinitis and epistaxis.  No recent epistaxis.  He endorses intermittent intranasal sore.  He uses CPAP.  He has history of sensorineural hearing loss.  He wears hearing aids.  He endorses intermittent left ear pressure, pulsing, and muffled hearing.  He uses Flonase and Astelin.  He can perform auto insufflation which helps some.    Past Medical History, Past Surgical History, Family history, Social History, and Medications were all reviewed with the patient today and updated as necessary.     No Known Allergies  Patient Active Problem List   Diagnosis    CAD (coronary artery disease)    Hypoxemia    Obstructive sleep apnea (adult) (pediatric)    Obesity    DM (diabetes mellitus) (HCC)    Hyperlipidemia    COPD, severe (HCC)    Severe obesity (HCC)    HTN (hypertension)    Hx of tobacco use, presenting hazards to health    Asbestos exposure    Cigarette nicotine dependence in remission     Current Outpatient Medications   Medication Sig    acetaminophen (TYLENOL) 500 MG tablet Take 2 tablets by mouth every 6 hours as needed    atorvastatin (LIPITOR) 80 MG tablet Take 1 tablet by mouth nightly    atorvastatin (LIPITOR) 80 MG tablet Take 1 tablet by mouth daily    diclofenac (VOLTAREN) 75 MG EC tablet     empagliflozin (JARDIANCE) 25 MG tablet Take 0.5 tablets by mouth daily    famotidine (PEPCID) 40 MG tablet Take 1 tablet by mouth daily    Accu-Chek FastClix Lancets MISC     melatonin (MELATONIN MAXIMUM STRENGTH) 5 MG

## 2025-05-05 ENCOUNTER — TELEPHONE (OUTPATIENT)
Dept: ENT CLINIC | Age: 75
End: 2025-05-05

## 2025-05-05 NOTE — TELEPHONE ENCOUNTER
Pharmacy called wanting to know if they can use ofloxacin eye drops instead. I let them know that they could.

## 2025-07-21 ENCOUNTER — TELEPHONE (OUTPATIENT)
Dept: ENT CLINIC | Age: 75
End: 2025-07-21

## (undated) DEVICE — CONTAINER PREFIL FRMLN 40ML --

## (undated) DEVICE — SYR 5ML 1/5 GRAD LL NSAF LF --

## (undated) DEVICE — BLOCK BITE AD 60FR W/ VELC STRP ADDRESSES MOST PT AND

## (undated) DEVICE — NEEDLE INJ 25GA P5MM SHFT L230CM SHTH DIA2.5MM S STL TEF

## (undated) DEVICE — CANNULA NSL ORAL AD FOR CAPNOFLEX CO2 O2 AIRLFE

## (undated) DEVICE — KENDALL RADIOLUCENT FOAM MONITORING ELECTRODE RECTANGULAR SHAPE: Brand: KENDALL

## (undated) DEVICE — NDL PRT INJ NSAF BLNT 18GX1.5 --

## (undated) DEVICE — CONNECTOR TBNG OD5-7MM O2 END DISP

## (undated) DEVICE — SNARE POLYP SM W13MMXL240CM SHTH DIA2.4MM OVL FLX DISP

## (undated) DEVICE — REM POLYHESIVE ADULT PATIENT RETURN ELECTRODE: Brand: VALLEYLAB

## (undated) DEVICE — SYR 3ML LL TIP 1/10ML GRAD --